# Patient Record
Sex: FEMALE | Race: ASIAN | Employment: OTHER | URBAN - METROPOLITAN AREA
[De-identification: names, ages, dates, MRNs, and addresses within clinical notes are randomized per-mention and may not be internally consistent; named-entity substitution may affect disease eponyms.]

---

## 2017-01-13 ENCOUNTER — OFFICE VISIT (OUTPATIENT)
Dept: INTERNAL MEDICINE CLINIC | Age: 37
End: 2017-01-13

## 2017-01-13 VITALS
HEART RATE: 58 BPM | DIASTOLIC BLOOD PRESSURE: 53 MMHG | SYSTOLIC BLOOD PRESSURE: 110 MMHG | OXYGEN SATURATION: 99 % | BODY MASS INDEX: 30.49 KG/M2 | TEMPERATURE: 97.5 F | WEIGHT: 183 LBS | RESPIRATION RATE: 16 BRPM | HEIGHT: 65 IN

## 2017-01-13 DIAGNOSIS — F41.9 ANXIETY: ICD-10-CM

## 2017-01-13 DIAGNOSIS — E04.9 THYROID GOITER: Primary | ICD-10-CM

## 2017-01-13 DIAGNOSIS — Z12.39 BREAST SCREENING: ICD-10-CM

## 2017-01-13 RX ORDER — CLONAZEPAM 0.5 MG/1
0.5 TABLET ORAL 2 TIMES DAILY
Qty: 60 TAB | Refills: 5 | Status: SHIPPED | OUTPATIENT
Start: 2017-01-13 | End: 2017-08-23 | Stop reason: SDUPTHER

## 2017-01-13 NOTE — MR AVS SNAPSHOT
Visit Information Date & Time Provider Department Dept. Phone Encounter #  
 1/13/2017  7:30 AM Kina Marx MD [facility] [address] [id number] Upcoming Health Maintenance Date Due  
 PAP AKA CERVICAL CYTOLOGY 8/1/2016 INFLUENZA AGE 9 TO ADULT 8/1/2016 DTaP/Tdap/Td series (2 - Td) 3/25/2025 Allergies as of 1/13/2017  Review Complete On: 1/13/2017 By: Miguel Damian LPN No Known Allergies Current Immunizations  Reviewed on 9/30/2015 Name Date Hep B Vaccine 1/1/2006 Td 1/1/2006 Tdap 3/25/2015 10:52 AM  
  
 Not reviewed this visit You Were Diagnosed With   
  
 Codes Comments Thyroid goiter    -  Primary ICD-10-CM: E04.9 ICD-9-CM: 240.9 Anxiety     ICD-10-CM: F41.9 ICD-9-CM: 300.00 Breast screening     ICD-10-CM: Z12.39 
ICD-9-CM: V76.10 Vitals BP Pulse Temp Resp Height(growth percentile) Weight(growth percentile) 110/53 (BP 1 Location: Left arm, BP Patient Position: Sitting) (!) 58 97.5 °F (36.4 °C) (Oral) 16 5' 5\" (1.651 m) 183 lb (83 kg) LMP SpO2 BMI OB Status Smoking Status 01/02/2017 99% 30.45 kg/m2 Having regular periods Never Smoker Vitals History BMI and BSA Data Body Mass Index Body Surface Area  
 30.45 kg/m 2 1.95 m 2 Preferred Pharmacy Pharmacy Name Phone [id number] [address] [phone] Your Updated Medication List  
  
   
This list is accurate as of: 1/13/17  7:45 AM.  Always use your most recent med list.  
  
  
  
  
 clonazePAM 0.5 mg tablet Commonly known as:  González Alexi Take 1 Tab by mouth two (2) times a day. montelukast 10 mg tablet Commonly known as:  SINGULAIR Take 1 Tab by mouth daily. triamcinolone 55 mcg nasal inhaler Commonly known as:  NASACORT  
2 Sprays by Both Nostrils route daily. Prescriptions Printed Refills clonazePAM (KLONOPIN) 0.5 mg tablet 5 Sig: Take 1 Tab by mouth two (2) times a day. Class: Print Route: Oral  
  
We Performed the Following CBC WITH AUTOMATED DIFF [73961 CPT(R)] LIPID PANEL [20922 CPT(R)] METABOLIC PANEL, COMPREHENSIVE [49901 CPT(R)] TSH 3RD GENERATION [62984 CPT(R)] VITAMIN D, 25 HYDROXY [id number] CPT(R)] To-Do List   
 01/13/2017 Imaging:  [facility] 3D MELISSA W MAMMO BI SCREENING INCL CAD Introducing \[facility] [place] [facility]\"" & [facility] SERVICES! Dear Sherly Delgadillo: 
Thank you for requesting a [employer] account. Our records indicate that you already have an active CENX account. You can access your account anytime at [email or web address] Did you know that you can access your hospital and ER discharge instructions at any time in CENX? You can also review all of your test results from your hospital stay or ER visit. Additional Information If you have questions, please visit the Frequently Asked Questions section of the CENX website at [email or web address] Remember, CENX is NOT to be used for urgent needs. For medical emergencies, dial 911. Now available from your iPhone and Android! Please provide this summary of care documentation to your next provider. Your primary care clinician is listed as Sue Jimenez. If you have any questions after today's visit, please call [phone].

## 2017-01-13 NOTE — PROGRESS NOTES
HISTORY OF PRESENT ILLNESS  Baljinder Lopes is a 39 y.o. female. HPI  Anxiety Review:  Patient is seen for anxiety disorder. Current treatment includes clonazepam and doing well with 2 per day-some inc stress in dec but managed well and now work has calmed down  No plans for pregnancy  Trying to exercise. Ongoig symptoms include: none. Patient denies: palpitations, sweating, insomnia. Reported side effects from the treatment: none. Thyroid goiter-no compressive sxs  Review of Systems   Constitutional: Negative for chills, fever, malaise/fatigue and weight loss. Respiratory: Negative for cough, shortness of breath and wheezing. Cardiovascular: Negative for chest pain, palpitations, orthopnea, leg swelling and PND. Gastrointestinal: Negative for heartburn and nausea. Musculoskeletal: Negative for myalgias. Neurological: Negative for dizziness and headaches. Psychiatric/Behavioral: The patient is not nervous/anxious and does not have insomnia. Physical Exam   Constitutional: She is oriented to person, place, and time. She appears well-developed and well-nourished. HENT:   Head: Normocephalic and atraumatic. Neck: Normal range of motion. Neck supple. Carotid bruit is not present. Thyromegaly present. Cardiovascular: Normal rate, regular rhythm, S1 normal, S2 normal, normal heart sounds and intact distal pulses. No murmur heard. Pulmonary/Chest: Effort normal and breath sounds normal. No respiratory distress. She has no wheezes. She has no rales. Musculoskeletal: She exhibits no edema. Neurological: She is alert and oriented to person, place, and time. Psychiatric: She has a normal mood and affect. Her behavior is normal.   Nursing note and vitals reviewed. ASSESSMENT and PLAN  Tran Gutierrez was seen today for anxiety and labs.     Diagnoses and all orders for this visit:    Thyroid goiter  -     CBC WITH AUTOMATED DIFF  -     METABOLIC PANEL, COMPREHENSIVE  -     LIPID PANEL  - TSH 3RD GENERATION  -     VITAMIN D, 25 HYDROXY    Anxiety  -     clonazePAM (KLONOPIN) 0.5 mg tablet; Take 1 Tab by mouth two (2) times a day. Breast screening  -     [facility] 3D MELISSA W MAMMO BI SCREENING INCL CAD;  Future

## 2017-01-14 LAB
25(OH)D3+25(OH)D2 SERPL-MCNC: 21.6 NG/ML (ref 30–100)
ALBUMIN SERPL-MCNC: 4 G/DL (ref 3.5–5.5)
ALBUMIN/GLOB SERPL: 1.5 {RATIO} (ref 1.1–2.5)
ALP SERPL-CCNC: 43 IU/L (ref 39–117)
ALT SERPL-CCNC: 13 IU/L (ref 0–32)
AST SERPL-CCNC: 11 IU/L (ref 0–40)
BASOPHILS # BLD AUTO: 0.1 X10E3/UL (ref 0–0.2)
BASOPHILS NFR BLD AUTO: 1 %
BILIRUB SERPL-MCNC: 0.3 MG/DL (ref 0–1.2)
BUN SERPL-MCNC: 12 MG/DL (ref 6–20)
BUN/CREAT SERPL: 15 (ref 8–20)
CALCIUM SERPL-MCNC: 9.4 MG/DL (ref 8.7–10.2)
CHLORIDE SERPL-SCNC: 99 MMOL/L (ref 96–106)
CHOLEST SERPL-MCNC: 189 MG/DL (ref 100–199)
CO2 SERPL-SCNC: 22 MMOL/L (ref 18–29)
CREAT SERPL-MCNC: 0.79 MG/DL (ref 0.57–1)
EOSINOPHIL # BLD AUTO: 0.2 X10E3/UL (ref 0–0.4)
EOSINOPHIL NFR BLD AUTO: 4 %
ERYTHROCYTE [DISTWIDTH] IN BLOOD BY AUTOMATED COUNT: 12.8 % (ref 12.3–15.4)
GLOBULIN SER CALC-MCNC: 2.6 G/DL (ref 1.5–4.5)
GLUCOSE SERPL-MCNC: 103 MG/DL (ref 65–99)
HCT VFR BLD AUTO: 36.7 % (ref 34–46.6)
HDLC SERPL-MCNC: 52 MG/DL
HGB BLD-MCNC: 12.4 G/DL (ref 11.1–15.9)
IMM GRANULOCYTES # BLD: 0 X10E3/UL (ref 0–0.1)
IMM GRANULOCYTES NFR BLD: 0 %
INTERPRETATION, 910389: NORMAL
LDLC SERPL CALC-MCNC: 124 MG/DL (ref 0–99)
LYMPHOCYTES # BLD AUTO: 1.5 X10E3/UL (ref 0.7–3.1)
LYMPHOCYTES NFR BLD AUTO: 25 %
MCH RBC QN AUTO: 28.6 PG (ref 26.6–33)
MCHC RBC AUTO-ENTMCNC: 33.8 G/DL (ref 31.5–35.7)
MCV RBC AUTO: 85 FL (ref 79–97)
MONOCYTES # BLD AUTO: 0.6 X10E3/UL (ref 0.1–0.9)
MONOCYTES NFR BLD AUTO: 9 %
NEUTROPHILS # BLD AUTO: 3.8 X10E3/UL (ref 1.4–7)
NEUTROPHILS NFR BLD AUTO: 61 %
PLATELET # BLD AUTO: 359 X10E3/UL (ref 150–379)
POTASSIUM SERPL-SCNC: 4.5 MMOL/L (ref 3.5–5.2)
PROT SERPL-MCNC: 6.6 G/DL (ref 6–8.5)
RBC # BLD AUTO: 4.34 X10E6/UL (ref 3.77–5.28)
SODIUM SERPL-SCNC: 137 MMOL/L (ref 134–144)
TRIGL SERPL-MCNC: 65 MG/DL (ref 0–149)
TSH SERPL DL<=0.005 MIU/L-ACNC: 0.87 UIU/ML (ref 0.45–4.5)
VLDLC SERPL CALC-MCNC: 13 MG/DL (ref 5–40)
WBC # BLD AUTO: 6.1 X10E3/UL (ref 3.4–10.8)

## 2017-02-17 ENCOUNTER — HOSPITAL ENCOUNTER (OUTPATIENT)
Dept: MAMMOGRAPHY | Age: 37
Discharge: HOME | End: 2017-02-17
Attending: INTERNAL MEDICINE
Payer: COMMERCIAL

## 2017-02-17 DIAGNOSIS — Z12.39 BREAST SCREENING: ICD-10-CM

## 2017-02-17 PROCEDURE — 77063 BREAST TOMOSYNTHESIS BI: CPT

## 2017-08-23 ENCOUNTER — OFFICE VISIT (OUTPATIENT)
Dept: INTERNAL MEDICINE CLINIC | Age: 37
End: 2017-08-23

## 2017-08-23 VITALS
RESPIRATION RATE: 16 BRPM | BODY MASS INDEX: 30.29 KG/M2 | WEIGHT: 181.8 LBS | HEART RATE: 73 BPM | DIASTOLIC BLOOD PRESSURE: 68 MMHG | OXYGEN SATURATION: 99 % | HEIGHT: 65 IN | SYSTOLIC BLOOD PRESSURE: 115 MMHG | TEMPERATURE: 98.2 F

## 2017-08-23 DIAGNOSIS — F41.9 ANXIETY: ICD-10-CM

## 2017-08-23 DIAGNOSIS — E04.9 THYROID GOITER: Primary | ICD-10-CM

## 2017-08-23 RX ORDER — GLUCOSAM/CHONDRO/HERB 149/HYAL 750-100 MG
TABLET ORAL
COMMUNITY

## 2017-08-23 RX ORDER — CLONAZEPAM 0.5 MG/1
0.5 TABLET ORAL 2 TIMES DAILY
Qty: 60 TAB | Refills: 5 | Status: SHIPPED | OUTPATIENT
Start: 2017-08-23 | End: 2018-02-21 | Stop reason: SDUPTHER

## 2017-08-23 RX ORDER — BISMUTH SUBSALICYLATE 262 MG
1 TABLET,CHEWABLE ORAL DAILY
COMMUNITY

## 2017-08-23 NOTE — MR AVS SNAPSHOT
Visit Information Date & Time Provider Department Dept. Phone Encounter #  
 8/23/2017  2:15 PM Julian Do MD [facility] [id number] Your Appointments 8/23/2017  2:15 PM  
ROUTINE CARE with Julian Do MD  
[facility] [address]) Appt Note: 6 mo follow up CP $50, gb  7/10/17; 6 mo follow up CP $50, gb 7/10/17 [address] [place] [address] [address]  
[phone]  
  
   
 [address] [place] [address] [facility] [address] Upcoming Health Maintenance Date Due  
 PAP AKA CERVICAL CYTOLOGY 8/1/2016 INFLUENZA AGE 9 TO ADULT 8/1/2017 DTaP/Tdap/Td series (2 - Td) 3/25/2025 Allergies as of 8/23/2017  Review Complete On: 8/23/2017 By: Nadja Campoverde LPN No Known Allergies Current Immunizations  Reviewed on 9/30/2015 Name Date Hep B Vaccine 1/1/2006 Td 1/1/2006 Tdap 3/25/2015 10:52 AM  
  
 Not reviewed this visit You Were Diagnosed With   
  
 Codes Comments Thyroid goiter    -  Primary ICD-10-CM: E04.9 ICD-9-CM: 240.9 Anxiety     ICD-10-CM: F41.9 ICD-9-CM: 300.00 Vitals BP Pulse Temp Resp Height(growth percentile) Weight(growth percentile)  
 115/68 (BP 1 Location: Left arm, BP Patient Position: Sitting) 73 98.2 °F (36.8 °C) (Oral) 16 5' 5\" (1.651 m) 181 lb 12.8 oz (82.5 kg) LMP SpO2 BMI OB Status Smoking Status 07/30/2017 (Exact Date) 99% 30.25 kg/m2 Having regular periods Never Smoker Vitals History BMI and BSA Data Body Mass Index Body Surface Area  
 30.25 kg/m 2 1.95 m 2 Preferred Pharmacy Pharmacy Name Phone [id number] Jeanine Reddy, [address] [place] [address] [phone] Your Updated Medication List  
  
   
This list is accurate as of: 8/23/17  2:07 PM.  Always use your most recent med list.  
  
  
  
  
 clonazePAM 0.5 mg tablet Commonly known as:  Benjamin Primer Take 1 Tab by mouth two (2) times a day. FISH OIL 1,000 mg (120 mg-180 mg) Cap Generic drug:  Omega-3-DHA-EPA-Fish Oil Take  by mouth.  
  
 montelukast 10 mg tablet Commonly known as:  SINGULAIR Take 1 Tab by mouth daily. multivitamin tablet Commonly known as:  ONE A DAY Take 1 Tab by mouth daily. triamcinolone 55 mcg nasal inhaler Commonly known as:  NASACORT  
2 Sprays by Both Nostrils route daily. Prescriptions Printed Refills  
 clonazePAM (KLONOPIN) 0.5 mg tablet 5 Sig: Take 1 Tab by mouth two (2) times a day. Class: Print Route: Oral  
  
We Performed the Following REFERRAL TO OBSTETRICS AND GYNECOLOGY [REF51 Custom] Comments:  
 Please evaluate patient for  screen. Referral Information Referral ID Referred By Referred To  
  
 [id number] Jeremy Feliciano MD   
   [address] [facility] [address] [facility], [address] Phone: [phone] Fax: [phone] Visits Status Start Date End Date 1 New Request 8/23/17 8/23/18 If your referral has a status of pending review or denied, additional information will be sent to support the outcome of this decision. Introducing [facility] & HEALTH SERVICES! Dear Bart Gaines: 
Thank you for requesting a [employer] account. Our records indicate that you already have an active [employer] account. You can access your account anytime at [email or web address] Did you know that you can access your hospital and ER discharge instructions at any time in [employer]? You can also review all of your test results from your hospital stay or ER visit. Additional Information If you have questions, please visit the Frequently Asked Questions section of the [employer] website at [email or web address] Remember, [employer] is NOT to be used for urgent needs. For medical emergencies, dial 911. Now available from your iPhone and Android! Please provide this summary of care documentation to your next provider. Your primary care clinician is listed as Sue 68. If you have any questions after today's visit, please call [phone].

## 2017-08-24 NOTE — PROGRESS NOTES
HISTORY OF PRESENT ILLNESS  Marci Mccabe is a 44 y.o. female. HPI  Follow up on thyroid goiter and anxiety, doing well. Very busy with work, going to [place] for her sister's wedding. Has not escalated Klonopin use, no sedation, no evidence of seizures, no panic attacks. Swallowing is fine. No noted change in thyroid goiter. Review of Systems   Constitutional: Negative. Negative for chills, diaphoresis, fever and malaise/fatigue. HENT: Negative for congestion, ear discharge, ear pain, nosebleeds and sore throat. Eyes: Negative for pain and discharge. Respiratory: Negative for cough, hemoptysis, sputum production, shortness of breath and wheezing. Cardiovascular: Negative for chest pain. Neurological: Negative for seizures and headaches. Psychiatric/Behavioral: Negative for depression. The patient is not nervous/anxious and does not have insomnia. Physical Exam   Constitutional: She is oriented to person, place, and time. She appears well-developed and well-nourished. HENT:   Head: Normocephalic and atraumatic. Neck: Normal range of motion. Neck supple. Carotid bruit is not present. Thyromegaly present. Cardiovascular: Normal rate, regular rhythm, S1 normal, S2 normal, normal heart sounds and intact distal pulses. No murmur heard. Pulmonary/Chest: Effort normal and breath sounds normal. No respiratory distress. She has no wheezes. She has no rales. Musculoskeletal: She exhibits no edema. Neurological: She is alert and oriented to person, place, and time. Psychiatric: She has a normal mood and affect. Her behavior is normal.   Nursing note and vitals reviewed. ASSESSMENT and PLAN  Diagnoses and all orders for this visit:    1. Thyroid goiter-cont to follow labs good in jan will repeat at next visit in [place] [address]    2. Anxiety  -     clonazePAM (KLONOPIN) 0.5 mg tablet; Take 1 Tab by mouth two (2) times a day.

## 2018-02-21 ENCOUNTER — OFFICE VISIT (OUTPATIENT)
Dept: INTERNAL MEDICINE CLINIC | Age: 38
End: 2018-02-21

## 2018-02-21 VITALS
DIASTOLIC BLOOD PRESSURE: 75 MMHG | HEART RATE: 77 BPM | HEIGHT: 65 IN | TEMPERATURE: 98.3 F | RESPIRATION RATE: 16 BRPM | BODY MASS INDEX: 29.49 KG/M2 | SYSTOLIC BLOOD PRESSURE: 120 MMHG | OXYGEN SATURATION: 97 % | WEIGHT: 177 LBS

## 2018-02-21 DIAGNOSIS — J30.2 OTHER SEASONAL ALLERGIC RHINITIS: ICD-10-CM

## 2018-02-21 DIAGNOSIS — E04.9 THYROID GOITER: Primary | ICD-10-CM

## 2018-02-21 DIAGNOSIS — F41.9 ANXIETY: ICD-10-CM

## 2018-02-21 RX ORDER — MONTELUKAST SODIUM 10 MG/1
10 TABLET ORAL DAILY
Qty: 30 TAB | Refills: 5 | Status: SHIPPED | OUTPATIENT
Start: 2018-02-21 | End: 2021-09-01 | Stop reason: ALTCHOICE

## 2018-02-21 RX ORDER — CLONAZEPAM 0.5 MG/1
0.5 TABLET ORAL 2 TIMES DAILY
Qty: 60 TAB | Refills: 5 | Status: SHIPPED | OUTPATIENT
Start: 2018-02-21 | End: 2018-08-22 | Stop reason: SDUPTHER

## 2018-02-21 NOTE — PROGRESS NOTES
HISTORY OF PRESENT ILLNESS  Sonia Castellano is a 44 y.o. female. HPI  1. Followup. She has a thyroid goiter. It does not cause trouble with her voice or trouble swallowing. We are going to need an ultrasound and check thyroid levels. 2. Anxiety. Very stable on the Klonopin. 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 Energy is good. She is encouraged to have a pelvic exam.  She will see a gynecologist at [facility].         Review of Systems   Constitutional: Negative for chills, fever and weight loss. Respiratory: Negative for cough, shortness of breath and wheezing. Cardiovascular: Negative for chest pain, palpitations, orthopnea, leg swelling and PND. Gastrointestinal: Negative for heartburn and nausea. Musculoskeletal: Negative for myalgias. Neurological: Negative for dizziness and headaches. Endo/Heme/Allergies: Positive for environmental allergies. Psychiatric/Behavioral: Negative for depression. The patient is not nervous/anxious. Physical Exam   Constitutional: She is oriented to person, place, and time. She appears well-developed and well-nourished. HENT:   Head: Normocephalic and atraumatic. Right Ear: External ear normal.   Left Ear: External ear normal.   Mouth/Throat: Oropharynx is clear and moist.   Neck: Normal range of motion. Neck supple. Carotid bruit is not present. Thyromegaly present. Cardiovascular: Normal rate, regular rhythm, S1 normal, S2 normal, normal heart sounds and intact distal pulses. No murmur heard. Pulmonary/Chest: Effort normal and breath sounds normal. No respiratory distress. She has no wheezes. She has no rales. Musculoskeletal: She exhibits no edema. Neurological: She is alert and oriented to person, place, and time. Psychiatric: She has a normal mood and affect. Her behavior is normal.   Nursing note and vitals reviewed.       ASSESSMENT and PLAN  Diagnoses and all orders for this visit: 1. Thyroid goiter  -     US THYROID/PARATHYROID/SOFT TISS; Future  -     CBC WITH AUTOMATED DIFF  -     METABOLIC PANEL, COMPREHENSIVE  -     LIPID PANEL  -     TSH 3RD GENERATION  -     T4, FREE    2. Anxiety  -     clonazePAM (KLONOPIN) 0.5 mg tablet; Take 1 Tab by mouth two (2) times a day. 3. Other seasonal allergic rhinitis  -     montelukast (SINGULAIR) 10 mg tablet; Take 1 Tab by mouth daily.

## 2018-02-22 LAB
ALBUMIN SERPL-MCNC: 4.3 G/DL (ref 3.5–5.5)
ALBUMIN/GLOB SERPL: 1.7 {RATIO} (ref 1.2–2.2)
ALP SERPL-CCNC: 43 IU/L (ref 39–117)
ALT SERPL-CCNC: 16 IU/L (ref 0–32)
AST SERPL-CCNC: 13 IU/L (ref 0–40)
BASOPHILS # BLD AUTO: 0.1 X10E3/UL (ref 0–0.2)
BASOPHILS NFR BLD AUTO: 1 %
BILIRUB SERPL-MCNC: 0.4 MG/DL (ref 0–1.2)
BUN SERPL-MCNC: 13 MG/DL (ref 6–20)
BUN/CREAT SERPL: 16 (ref 9–23)
CALCIUM SERPL-MCNC: 9.5 MG/DL (ref 8.7–10.2)
CHLORIDE SERPL-SCNC: 99 MMOL/L (ref 96–106)
CHOLEST SERPL-MCNC: 151 MG/DL (ref 100–199)
CO2 SERPL-SCNC: 23 MMOL/L (ref 18–29)
CREAT SERPL-MCNC: 0.81 MG/DL (ref 0.57–1)
EOSINOPHIL # BLD AUTO: 0.3 X10E3/UL (ref 0–0.4)
EOSINOPHIL NFR BLD AUTO: 5 %
ERYTHROCYTE [DISTWIDTH] IN BLOOD BY AUTOMATED COUNT: 13.4 % (ref 12.3–15.4)
GFR SERPLBLD CREATININE-BSD FMLA CKD-EPI: 107 ML/MIN/{1.73_M2}
GFR SERPLBLD CREATININE-BSD FMLA CKD-EPI: 92 ML/MIN/{1.73_M2}
GLOBULIN SER CALC-MCNC: 2.5 G/L (ref 1.5–4.5)
GLUCOSE SERPL-MCNC: 98 MG/DL (ref 65–99)
HCT VFR BLD AUTO: 38.8 % (ref 34–46.6)
HDLC SERPL-MCNC: 45 MG/DL
HGB BLD-MCNC: 12.6 G/DL (ref 11.1–15.9)
IMM GRANULOCYTES # BLD: 0 X10E3/UL (ref 0–0.1)
IMM GRANULOCYTES NFR BLD: 0 %
INTERPRETATION, 910389: NORMAL
LDLC SERPL CALC-MCNC: 93 MG/DL (ref 0–99)
LYMPHOCYTES # BLD AUTO: 1.7 X10E3/UL (ref 0.7–3.1)
LYMPHOCYTES NFR BLD AUTO: 26 %
MCH RBC QN AUTO: 28.3 PG (ref 26.6–33)
MCHC RBC AUTO-ENTMCNC: 32.5 G/DL (ref 31.5–35.7)
MCV RBC AUTO: 87 FL (ref 79–97)
MONOCYTES # BLD AUTO: 0.6 X10E3/UL (ref 0.1–0.9)
MONOCYTES NFR BLD AUTO: 10 %
NEUTROPHILS # BLD AUTO: 3.7 X10E3/UL (ref 1.4–7)
NEUTROPHILS NFR BLD AUTO: 58 %
PLATELET # BLD AUTO: 373 X10E3/UL (ref 150–379)
POTASSIUM SERPL-SCNC: 4.8 MMOL/L (ref 3.5–5.2)
PROT SERPL-MCNC: 6.8 G/DL (ref 6–8.5)
RBC # BLD AUTO: 4.45 X10E6/UL (ref 3.77–5.28)
SODIUM SERPL-SCNC: 139 MMOL/L (ref 134–144)
T4 FREE SERPL-MCNC: 1.28 NG/DL (ref 0.82–1.77)
TRIGL SERPL-MCNC: 66 MG/DL (ref 0–149)
TSH SERPL DL<=0.005 MIU/L-ACNC: 0.78 UIU/ML (ref 0.45–4.5)
VLDLC SERPL CALC-MCNC: 13 MG/DL (ref 5–40)
WBC # BLD AUTO: 6.4 X10E3/UL (ref 3.4–10.8)

## 2018-02-28 ENCOUNTER — HOSPITAL ENCOUNTER (OUTPATIENT)
Dept: ULTRASOUND IMAGING | Age: 38
Discharge: HOME | End: 2018-02-28
Payer: COMMERCIAL

## 2018-02-28 DIAGNOSIS — E04.9 THYROID GOITER: ICD-10-CM

## 2018-02-28 PROCEDURE — 76536 US EXAM OF HEAD AND NECK: CPT

## 2018-03-01 ENCOUNTER — TELEPHONE (OUTPATIENT)
Dept: INTERNAL MEDICINE CLINIC | Age: 38
End: 2018-03-01

## 2018-03-01 NOTE — TELEPHONE ENCOUNTER
Left a message I do want to send her to endocrine for the nodule in thyroid-please schedule appt for her thanks

## 2018-03-01 NOTE — TELEPHONE ENCOUNTER
Chart notes & recent reports faxed to [facility] for review to schedule a new patient visit. They will contact the patient to schedule.

## 2018-04-18 ENCOUNTER — OFFICE VISIT (OUTPATIENT)
Dept: INTERNAL MEDICINE CLINIC | Age: 38
End: 2018-04-18

## 2018-04-18 VITALS
SYSTOLIC BLOOD PRESSURE: 107 MMHG | BODY MASS INDEX: 29.82 KG/M2 | WEIGHT: 179 LBS | HEIGHT: 65 IN | RESPIRATION RATE: 18 BRPM | OXYGEN SATURATION: 99 % | HEART RATE: 68 BPM | DIASTOLIC BLOOD PRESSURE: 73 MMHG | TEMPERATURE: 97.8 F

## 2018-04-18 DIAGNOSIS — J06.9 ACUTE URI: Primary | ICD-10-CM

## 2018-04-18 DIAGNOSIS — E04.9 THYROID GOITER: ICD-10-CM

## 2018-04-18 DIAGNOSIS — B30.9 ACUTE VIRAL CONJUNCTIVITIS OF BOTH EYES: ICD-10-CM

## 2018-04-18 NOTE — PROGRESS NOTES
HISTORY OF PRESENT ILLNESS  Pita Garcia is a 43 y.o. female. HPI  Several concerns. She was exposed to viral URI last weekend with her nieces. Earlier this week she developed conjunctivitis and has started using an antibiotic steroid drop called Zylet in the last 24 hours. She has some ear fullness. No fevers, cough or purulent drainage. She also wants to discuss recent issues with her thyroid goiter. She has been working with endocrine and had a fine needle aspiration, which came back indeterminate for malignancy so she's been referred to the surgeon, Dr. Breanne Sampson, who she will see next week. She is understandably anxious about what to expect with surgery and wants my opinion on having another FNA. Review of Systems   Constitutional: Positive for malaise/fatigue. Negative for chills, diaphoresis and fever. HENT: Negative for congestion, ear discharge, ear pain, nosebleeds and sore throat. Eyes: Positive for discharge. Negative for pain. Respiratory: Negative for cough, hemoptysis, sputum production, shortness of breath and wheezing. Cardiovascular: Negative for chest pain. Neurological: Negative for headaches. Physical Exam   Constitutional: She appears well-developed and well-nourished. HENT:   Head: Normocephalic. Right Ear: Tympanic membrane, external ear and ear canal normal.   Left Ear: Tympanic membrane, external ear and ear canal normal.   Nose: Nose normal.   Mouth/Throat: Oropharynx is clear and moist and mucous membranes are normal. No oropharyngeal exudate. Eyes: Conjunctivae are normal. Pupils are equal, round, and reactive to light. Right eye exhibits no discharge. Left eye exhibits no discharge. Neck: Normal range of motion. Neck supple. Thyromegaly present. Cardiovascular: Normal rate, regular rhythm and normal heart sounds. Pulmonary/Chest: Effort normal and breath sounds normal. No respiratory distress. She has no wheezes. She has no rales. Lymphadenopathy:     She has no cervical adenopathy. Nursing note and vitals reviewed. ASSESSMENT and PLAN  Diagnoses and all orders for this visit:    1. Acute URI  Cont tylenol and zylet eye drops and nasacort  2. Thyroid goiter  Discussed with indeterminate fna of gland I would proceed with surgical eval for partial resection as planned and offered to discuss further after she see the surgeon  3.  Acute viral conjunctivitis of both eyes

## 2018-05-24 ENCOUNTER — TELEPHONE (OUTPATIENT)
Dept: INTERNAL MEDICINE CLINIC | Age: 38
End: 2018-05-24

## 2018-05-24 DIAGNOSIS — F41.9 ANXIETY: ICD-10-CM

## 2018-05-24 NOTE — TELEPHONE ENCOUNTER
Pt is leaving [facility] and [facility] if [facility] clonazepam a day early.  [phone]

## 2018-08-22 ENCOUNTER — OFFICE VISIT (OUTPATIENT)
Dept: INTERNAL MEDICINE CLINIC | Age: 38
End: 2018-08-22

## 2018-08-22 VITALS
BODY MASS INDEX: 29.99 KG/M2 | RESPIRATION RATE: 18 BRPM | HEIGHT: 65 IN | HEART RATE: 73 BPM | OXYGEN SATURATION: 98 % | WEIGHT: 180 LBS | TEMPERATURE: 98 F | DIASTOLIC BLOOD PRESSURE: 62 MMHG | SYSTOLIC BLOOD PRESSURE: 105 MMHG

## 2018-08-22 DIAGNOSIS — F41.9 ANXIETY: ICD-10-CM

## 2018-08-22 DIAGNOSIS — E04.9 THYROID GOITER: ICD-10-CM

## 2018-08-22 DIAGNOSIS — Z86.39 H/O THYROID NODULE: Primary | ICD-10-CM

## 2018-08-22 RX ORDER — CLONAZEPAM 0.5 MG/1
0.5 TABLET ORAL 2 TIMES DAILY
Qty: 60 TAB | Refills: 5 | Status: SHIPPED | OUTPATIENT
Start: 2018-08-22 | End: 2018-10-19 | Stop reason: SDUPTHER

## 2018-08-22 NOTE — PROGRESS NOTES
HISTORY OF PRESENT ILLNESS  Cintia Celestin is a 43 y.o. female. HPI  First visit with me after left thyroid resection because of nodule. Pathology came back benign, showing maybe early Hashimoto's. Surgery was done July 24th at [facility] with Dr. Pat Webb and pathology report was reviewed. She feels well. She denies any symptoms of hypo or hyperthyroidism. We have discussed monitoring of levels post surgery and will plan check today and then again in three months assuming levels are good. Anxiety, very stable on Klonopin. No escalation of use. Will refill. Review of Systems   Constitutional: Negative for chills, fever, malaise/fatigue and weight loss. Respiratory: Negative for cough, shortness of breath and wheezing. Cardiovascular: Negative for chest pain, palpitations, orthopnea, leg swelling and PND. Gastrointestinal: Negative for constipation, diarrhea, heartburn and nausea. Musculoskeletal: Negative for myalgias. Neurological: Negative for dizziness, tremors and headaches. Physical Exam   Constitutional: She is oriented to person, place, and time. She appears well-developed and well-nourished. HENT:   Head: Normocephalic and atraumatic. Neck: Normal range of motion. Neck supple. Carotid bruit is not present. No thyromegaly present. Healed thyroid scar no adenopathy   Cardiovascular: Normal rate, regular rhythm, S1 normal, S2 normal, normal heart sounds and intact distal pulses. No murmur heard. Pulmonary/Chest: Effort normal and breath sounds normal. No respiratory distress. She has no wheezes. She has no rales. Musculoskeletal: She exhibits no edema. Neurological: She is alert and oriented to person, place, and time. Psychiatric: She has a normal mood and affect. Her behavior is normal.   Nursing note and vitals reviewed. ASSESSMENT and PLAN  Diagnoses and all orders for this visit:    1.  H/O thyroid nodule  -     METABOLIC PANEL, COMPREHENSIVE  -     TSH 3RD GENERATION  -     T4, FREE  -     CBC WITH AUTOMATED DIFF    2. Anxiety  -     clonazePAM (KLONOPIN) 0.5 mg tablet; Take 1 Tab by mouth two (2) times a day.     3. Thyroid goiter  Sp left thyroid resection doing well

## 2018-08-23 LAB
ALBUMIN SERPL-MCNC: 4.5 G/DL (ref 3.5–5.5)
ALBUMIN/GLOB SERPL: 1.7 {RATIO} (ref 1.2–2.2)
ALP SERPL-CCNC: 44 IU/L (ref 39–117)
ALT SERPL-CCNC: 13 IU/L (ref 0–32)
AST SERPL-CCNC: 16 IU/L (ref 0–40)
BASOPHILS # BLD AUTO: 0.1 X10E3/UL (ref 0–0.2)
BASOPHILS NFR BLD AUTO: 1 %
BILIRUB SERPL-MCNC: 0.6 MG/DL (ref 0–1.2)
BUN SERPL-MCNC: 12 MG/DL (ref 6–20)
BUN/CREAT SERPL: 14 (ref 9–23)
CALCIUM SERPL-MCNC: 9.4 MG/DL (ref 8.7–10.2)
CHLORIDE SERPL-SCNC: 103 MMOL/L (ref 96–106)
CO2 SERPL-SCNC: 21 MMOL/L (ref 20–29)
CREAT SERPL-MCNC: 0.85 MG/DL (ref 0.57–1)
EOSINOPHIL # BLD AUTO: 0.3 X10E3/UL (ref 0–0.4)
EOSINOPHIL NFR BLD AUTO: 4 %
ERYTHROCYTE [DISTWIDTH] IN BLOOD BY AUTOMATED COUNT: 13.3 % (ref 12.3–15.4)
GLOBULIN SER CALC-MCNC: 2.7 G/DL (ref 1.5–4.5)
GLUCOSE SERPL-MCNC: 104 MG/DL (ref 65–99)
HCT VFR BLD AUTO: 37.6 % (ref 34–46.6)
HGB BLD-MCNC: 12.8 G/DL (ref 11.1–15.9)
IMM GRANULOCYTES # BLD: 0 X10E3/UL (ref 0–0.1)
IMM GRANULOCYTES NFR BLD: 0 %
LYMPHOCYTES # BLD AUTO: 1.9 X10E3/UL (ref 0.7–3.1)
LYMPHOCYTES NFR BLD AUTO: 26 %
MCH RBC QN AUTO: 28.3 PG (ref 26.6–33)
MCHC RBC AUTO-ENTMCNC: 34 G/DL (ref 31.5–35.7)
MCV RBC AUTO: 83 FL (ref 79–97)
MONOCYTES # BLD AUTO: 0.8 X10E3/UL (ref 0.1–0.9)
MONOCYTES NFR BLD AUTO: 11 %
NEUTROPHILS # BLD AUTO: 4.1 X10E3/UL (ref 1.4–7)
NEUTROPHILS NFR BLD AUTO: 58 %
PLATELET # BLD AUTO: 391 X10E3/UL (ref 150–379)
POTASSIUM SERPL-SCNC: 4.4 MMOL/L (ref 3.5–5.2)
PROT SERPL-MCNC: 7.2 G/DL (ref 6–8.5)
RBC # BLD AUTO: 4.52 X10E6/UL (ref 3.77–5.28)
SODIUM SERPL-SCNC: 140 MMOL/L (ref 134–144)
T4 FREE SERPL-MCNC: 1.12 NG/DL (ref 0.82–1.77)
TSH SERPL DL<=0.005 MIU/L-ACNC: 2.37 UIU/ML (ref 0.45–4.5)
WBC # BLD AUTO: 7.1 X10E3/UL (ref 3.4–10.8)

## 2018-10-19 ENCOUNTER — TELEPHONE (OUTPATIENT)
Dept: INTERNAL MEDICINE CLINIC | Age: 38
End: 2018-10-19

## 2018-10-19 DIAGNOSIS — F41.9 ANXIETY: ICD-10-CM

## 2018-10-19 NOTE — TELEPHONE ENCOUNTER
----- Message from Sandrita Pendleton sent at 10/19/2018  2:05 PM EDT -----  Regarding: Dr. Rika Magallanes  Pt is going out of town for [facility]. She will be flying to [facility]. She wants to make sure Dr. Yvette Robb will approve the medication if the pharmacy calls.   Best contact number is [phone]

## 2018-10-22 RX ORDER — CLONAZEPAM 0.5 MG/1
0.5 TABLET ORAL 2 TIMES DAILY
Qty: 60 TAB | Refills: 5 | Status: SHIPPED | OUTPATIENT
Start: 2018-10-22 | End: 2019-02-20 | Stop reason: SDUPTHER

## 2018-11-14 ENCOUNTER — OFFICE VISIT (OUTPATIENT)
Dept: INTERNAL MEDICINE CLINIC | Age: 38
End: 2018-11-14

## 2018-11-14 VITALS
HEART RATE: 60 BPM | WEIGHT: 174 LBS | SYSTOLIC BLOOD PRESSURE: 118 MMHG | OXYGEN SATURATION: 99 % | BODY MASS INDEX: 28.99 KG/M2 | RESPIRATION RATE: 16 BRPM | DIASTOLIC BLOOD PRESSURE: 64 MMHG | TEMPERATURE: 98 F | HEIGHT: 65 IN

## 2018-11-14 DIAGNOSIS — R73.01 IFG (IMPAIRED FASTING GLUCOSE): ICD-10-CM

## 2018-11-14 DIAGNOSIS — Z86.39 H/O THYROID NODULE: Primary | ICD-10-CM

## 2018-11-14 NOTE — PROGRESS NOTES
HISTORY OF PRESENT ILLNESS  Hira Zambrano is a 43 y.o. female. HPI  She feels well. Had to go to [place] because her uncle has some type of pulmonary effusion, unclear what the cause is. She has had thyroid nodule resected this summer, Hashimoto's thyroid, benign pathology. Thyroid function has so far been okay and she has not had any symptoms of hypo or hyperthyroidism. Her glucose was 104 and we discussed diet and exercise for this. Generally she feels well. Review of Systems   Constitutional: Negative for chills, fever, malaise/fatigue and weight loss. Respiratory: Negative for cough, shortness of breath and wheezing. Cardiovascular: Negative for chest pain, palpitations, orthopnea, leg swelling and PND. Gastrointestinal: Negative for abdominal pain, constipation, diarrhea, heartburn and nausea. Musculoskeletal: Negative for myalgias. Neurological: Negative for dizziness and headaches. Physical Exam   Constitutional: She is oriented to person, place, and time. She appears well-developed and well-nourished. HENT:   Head: Normocephalic and atraumatic. Neck: Normal range of motion. Neck supple. Carotid bruit is not present. No thyromegaly present. Healed scar at neck no masses or adenopathy   Cardiovascular: Normal rate, regular rhythm, S1 normal, S2 normal, normal heart sounds and intact distal pulses. No murmur heard. Pulmonary/Chest: Effort normal and breath sounds normal. No respiratory distress. She has no wheezes. She has no rales. Musculoskeletal: She exhibits no edema. Neurological: She is alert and oriented to person, place, and time. Psychiatric: She has a normal mood and affect. Her behavior is normal.   Nursing note and vitals reviewed. ASSESSMENT and PLAN  Diagnoses and all orders for this visit:    1. H/O thyroid nodule-benign resected  -     THYROID PANEL W/TSH  -     THYROID PEROXIDASE (TPO) AB    2.  IFG (impaired fasting glucose)  -     HEMOGLOBIN A1C WITH EAG

## 2018-11-15 LAB
EST. AVERAGE GLUCOSE BLD GHB EST-MCNC: 120 MG/DL
FT4I SERPL CALC-MCNC: 1.6 (ref 1.2–4.9)
HBA1C MFR BLD: 5.8 % (ref 4.8–5.6)
T3RU NFR SERPL: 25 % (ref 24–39)
T4 SERPL-MCNC: 6.3 UG/DL (ref 4.5–12)
THYROPEROXIDASE AB SERPL-ACNC: 10 IU/ML (ref 0–34)
TSH SERPL DL<=0.005 MIU/L-ACNC: 1.69 UIU/ML (ref 0.45–4.5)

## 2018-11-21 ENCOUNTER — TELEPHONE (OUTPATIENT)
Dept: INTERNAL MEDICINE CLINIC | Age: 38
End: 2018-11-21

## 2018-11-21 NOTE — TELEPHONE ENCOUNTER
----- Message from Deanne Navarro sent at 11/21/2018  9:55 AM EST -----  Regarding: Dr. Sibley Link from [facility] on behalf of legal and general life insurance checking on request for medical records faxed 11/15 and 11/19. Would like to know if we have sent it to [facility].   Annika Oliveira contact  [phone]

## 2019-02-20 ENCOUNTER — OFFICE VISIT (OUTPATIENT)
Dept: INTERNAL MEDICINE CLINIC | Age: 39
End: 2019-02-20

## 2019-02-20 VITALS
WEIGHT: 184 LBS | OXYGEN SATURATION: 98 % | TEMPERATURE: 98.6 F | BODY MASS INDEX: 30.66 KG/M2 | SYSTOLIC BLOOD PRESSURE: 111 MMHG | HEIGHT: 65 IN | RESPIRATION RATE: 16 BRPM | HEART RATE: 68 BPM | DIASTOLIC BLOOD PRESSURE: 63 MMHG

## 2019-02-20 DIAGNOSIS — E04.9 THYROID GOITER: Primary | ICD-10-CM

## 2019-02-20 DIAGNOSIS — F41.9 ANXIETY: ICD-10-CM

## 2019-02-20 DIAGNOSIS — R73.01 IFG (IMPAIRED FASTING GLUCOSE): ICD-10-CM

## 2019-02-20 RX ORDER — CLONAZEPAM 0.5 MG/1
0.5 TABLET ORAL 2 TIMES DAILY
Qty: 60 TAB | Refills: 5 | Status: SHIPPED | OUTPATIENT
Start: 2019-02-20 | End: 2019-09-18 | Stop reason: SDUPTHER

## 2019-02-20 NOTE — PROGRESS NOTES
HISTORY OF PRESENT ILLNESS  Fidel Betancourt is a 43 y.o. female. HPI  Has a history of thyroid goiter, status post partial resection in the summer showed early Hashimoto's. We have been following thyroid function and antibodies, which have so far been okay. She feels well. No symptoms of hypo or hyperthyroidism. Uncle was diagnosed with TB. He lives in [place]. She has mildly impaired fasting glucose and is trying to watch her diet. She is stable on Klonopin and has not escalated use. Needs a refill. Review of Systems   Constitutional: Negative for chills, fever, malaise/fatigue and weight loss. Respiratory: Negative for cough, shortness of breath and wheezing. Cardiovascular: Negative for chest pain, palpitations, orthopnea, leg swelling and PND. Gastrointestinal: Negative for heartburn and nausea. Musculoskeletal: Negative for myalgias. Neurological: Negative for dizziness and headaches. Psychiatric/Behavioral: Negative for depression. The patient is not nervous/anxious. Physical Exam   Constitutional: She is oriented to person, place, and time. She appears well-developed and well-nourished. HENT:   Head: Normocephalic and atraumatic. Neck: Normal range of motion. Neck supple. Carotid bruit is not present. No thyromegaly present. Healed scar at neck   Cardiovascular: Normal rate, regular rhythm, S1 normal, S2 normal, normal heart sounds and intact distal pulses. No murmur heard. Pulmonary/Chest: Effort normal and breath sounds normal. No respiratory distress. She has no wheezes. She has no rales. Musculoskeletal: She exhibits no edema. Lymphadenopathy:     She has no cervical adenopathy. Neurological: She is alert and oriented to person, place, and time. Psychiatric: She has a normal mood and affect. Her behavior is normal.   Nursing note and vitals reviewed. ASSESSMENT and PLAN  Diagnoses and all orders for this visit:    1.  Thyroid goiter-biopsy showed hashimotos  -     THYROID PANEL W/TSH  -     THYROID PEROXIDASE (TPO) AB    2. Anxiety  -     clonazePAM (KLONOPIN) 0.5 mg tablet; Take 1 Tab by mouth two (2) times a day.     3. IFG (impaired fasting glucose)  -     HEMOGLOBIN A1C WITH EAG      lab results and schedule of future lab studies reviewed with patient  reviewed diet, exercise and weight control

## 2019-02-21 LAB
EST. AVERAGE GLUCOSE BLD GHB EST-MCNC: 117 MG/DL
FT4I SERPL CALC-MCNC: 1.8 (ref 1.2–4.9)
HBA1C MFR BLD: 5.7 % (ref 4.8–5.6)
T3RU NFR SERPL: 26 % (ref 24–39)
T4 SERPL-MCNC: 6.8 UG/DL (ref 4.5–12)
THYROPEROXIDASE AB SERPL-ACNC: 12 IU/ML (ref 0–34)
TSH SERPL DL<=0.005 MIU/L-ACNC: 1.4 UIU/ML (ref 0.45–4.5)

## 2019-04-24 DIAGNOSIS — F41.9 ANXIETY: ICD-10-CM

## 2019-04-24 RX ORDER — CLONAZEPAM 0.5 MG/1
TABLET ORAL
Qty: 60 TAB | Refills: 0 | OUTPATIENT
Start: 2019-04-24

## 2019-04-24 NOTE — TELEPHONE ENCOUNTER
I wrote for 60 with 5 refills in 2/20/19  Can you call the pharmacy to see if they got that order?  If not ok to give 60 with 4 refills

## 2019-09-18 ENCOUNTER — OFFICE VISIT (OUTPATIENT)
Dept: INTERNAL MEDICINE CLINIC | Age: 39
End: 2019-09-18

## 2019-09-18 VITALS
DIASTOLIC BLOOD PRESSURE: 70 MMHG | BODY MASS INDEX: 30.99 KG/M2 | HEART RATE: 67 BPM | TEMPERATURE: 98.5 F | WEIGHT: 186 LBS | HEIGHT: 65 IN | OXYGEN SATURATION: 98 % | SYSTOLIC BLOOD PRESSURE: 106 MMHG | RESPIRATION RATE: 16 BRPM

## 2019-09-18 DIAGNOSIS — F41.9 ANXIETY: ICD-10-CM

## 2019-09-18 DIAGNOSIS — E55.9 VITAMIN D DEFICIENCY: ICD-10-CM

## 2019-09-18 DIAGNOSIS — R73.01 IFG (IMPAIRED FASTING GLUCOSE): ICD-10-CM

## 2019-09-18 DIAGNOSIS — E04.9 THYROID GOITER: Primary | ICD-10-CM

## 2019-09-18 RX ORDER — CLONAZEPAM 0.5 MG/1
0.5 TABLET ORAL 2 TIMES DAILY
Qty: 60 TAB | Refills: 5 | Status: SHIPPED | OUTPATIENT
Start: 2019-09-18 | End: 2020-08-31 | Stop reason: SDUPTHER

## 2019-09-18 RX ORDER — CHOLECALCIFEROL (VITAMIN D3) 125 MCG
CAPSULE ORAL
COMMUNITY

## 2019-09-18 NOTE — PROGRESS NOTES
HISTORY OF PRESENT ILLNESS  Darin Hinds is a 35 y.o. female. HPI  Follow up for thyroid goiter. She had a left thyroidectomy over a year ago. She feels well. She has had no symptoms of change in voice or trouble swallowing. She is due for thyroid ultrasound, as well as thyroid function. She is struggling trying to lose weight and we discussed strategies, including portion issues. Anxiety and history of seizure. She has done very well on Klonopin 0.5 b.i.d. No side effects, no sedation. Needs a refill. History of vitamin D deficiency, on 2,000 units daily. Due for levels. Review of Systems   Constitutional: Negative for chills, fever, malaise/fatigue and weight loss. Respiratory: Negative for cough, shortness of breath and wheezing. Cardiovascular: Negative for chest pain, palpitations, orthopnea, leg swelling and PND. Gastrointestinal: Negative for heartburn and nausea. Musculoskeletal: Negative for myalgias. Skin: Negative for rash. Neurological: Negative for dizziness, seizures and headaches. Psychiatric/Behavioral: The patient is not nervous/anxious. Physical Exam   Constitutional: She is oriented to person, place, and time. She appears well-developed and well-nourished. HENT:   Head: Normocephalic and atraumatic. Neck: Normal range of motion. Neck supple. Carotid bruit is not present. Thyromegaly present. Cardiovascular: Normal rate, regular rhythm, S1 normal, S2 normal, normal heart sounds and intact distal pulses. No murmur heard. Pulmonary/Chest: Effort normal and breath sounds normal. No respiratory distress. She has no wheezes. She has no rales. Musculoskeletal: She exhibits no edema. Lymphadenopathy:     She has no cervical adenopathy. Neurological: She is alert and oriented to person, place, and time. Psychiatric: She has a normal mood and affect. Her behavior is normal.   Nursing note and vitals reviewed.       ASSESSMENT and PLAN  Diagnoses and all orders for this visit:    1. Thyroid goiter  -     TSH 3RD GENERATION  -     T4, FREE  -     US THYROID/PARATHYROID/SOFT TISS; Future    2. IFG (impaired fasting glucose)  -     HEMOGLOBIN A1C WITH EAG  -     METABOLIC PANEL, COMPREHENSIVE  -     LIPID PANEL    3. Vitamin D deficiency  -     VITAMIN D, 25 HYDROXY    4. Anxiety  -     clonazePAM (KLONOPIN) 0.5 mg tablet; Take 1 Tab by mouth two (2) times a day.     Discussed weight loss

## 2019-09-19 LAB
25(OH)D3+25(OH)D2 SERPL-MCNC: 29.2 NG/ML (ref 30–100)
ALBUMIN SERPL-MCNC: 4.5 G/DL (ref 3.5–5.5)
ALBUMIN/GLOB SERPL: 2 {RATIO} (ref 1.2–2.2)
ALP SERPL-CCNC: 41 IU/L (ref 39–117)
ALT SERPL-CCNC: 13 IU/L (ref 0–32)
AST SERPL-CCNC: 13 IU/L (ref 0–40)
BILIRUB SERPL-MCNC: 0.5 MG/DL (ref 0–1.2)
BUN SERPL-MCNC: 11 MG/DL (ref 6–24)
BUN/CREAT SERPL: 13 (ref 9–23)
CALCIUM SERPL-MCNC: 9.1 MG/DL (ref 8.7–10.2)
CHLORIDE SERPL-SCNC: 102 MMOL/L (ref 96–106)
CHOLEST SERPL-MCNC: 185 MG/DL (ref 100–199)
CO2 SERPL-SCNC: 24 MMOL/L (ref 20–29)
CREAT SERPL-MCNC: 0.82 MG/DL (ref 0.57–1)
EST. AVERAGE GLUCOSE BLD GHB EST-MCNC: 123 MG/DL
GLOBULIN SER CALC-MCNC: 2.2 G/DL (ref 1.5–4.5)
GLUCOSE SERPL-MCNC: 104 MG/DL (ref 65–99)
HBA1C MFR BLD: 5.9 % (ref 4.8–5.6)
HDLC SERPL-MCNC: 49 MG/DL
INTERPRETATION, 910389: NORMAL
LDLC SERPL CALC-MCNC: 114 MG/DL (ref 0–99)
POTASSIUM SERPL-SCNC: 4.5 MMOL/L (ref 3.5–5.2)
PROT SERPL-MCNC: 6.7 G/DL (ref 6–8.5)
SODIUM SERPL-SCNC: 139 MMOL/L (ref 134–144)
T4 FREE SERPL-MCNC: 1.07 NG/DL (ref 0.82–1.77)
TRIGL SERPL-MCNC: 110 MG/DL (ref 0–149)
TSH SERPL DL<=0.005 MIU/L-ACNC: 1.41 UIU/ML (ref 0.45–4.5)
VLDLC SERPL CALC-MCNC: 22 MG/DL (ref 5–40)

## 2019-09-25 ENCOUNTER — HOSPITAL ENCOUNTER (OUTPATIENT)
Dept: ULTRASOUND IMAGING | Age: 39
Discharge: HOME | End: 2019-09-25
Attending: INTERNAL MEDICINE
Payer: COMMERCIAL

## 2019-09-25 DIAGNOSIS — E04.9 THYROID GOITER: ICD-10-CM

## 2019-09-25 PROCEDURE — 76536 US EXAM OF HEAD AND NECK: CPT

## 2020-03-17 ENCOUNTER — TELEPHONE (OUTPATIENT)
Dept: INTERNAL MEDICINE CLINIC | Age: 40
End: 2020-03-17

## 2020-03-17 DIAGNOSIS — F41.9 ANXIETY: Primary | ICD-10-CM

## 2020-03-17 RX ORDER — CLONAZEPAM 0.5 MG/1
0.5 TABLET ORAL
Qty: 30 TAB | Refills: 4 | Status: SHIPPED | OUTPATIENT
Start: 2020-03-17 | End: 2020-03-23 | Stop reason: DRUGHIGH

## 2020-03-17 NOTE — TELEPHONE ENCOUNTER
Left her a message that I refilled the klonopin and do not need to see her in office given covid and will see her in the summer for follow up

## 2020-03-23 DIAGNOSIS — F41.9 ANXIETY: ICD-10-CM

## 2020-03-23 RX ORDER — CLONAZEPAM 0.5 MG/1
0.5 TABLET ORAL 2 TIMES DAILY
Qty: 60 TAB | Refills: 4 | Status: SHIPPED | OUTPATIENT
Start: 2020-03-23 | End: 2021-02-26 | Stop reason: SDUPTHER

## 2020-03-23 NOTE — TELEPHONE ENCOUNTER
----- Message from Sam King sent at 3/23/2020  9:28 AM EDT -----  Regarding: Jeffrey/telephone  Pt stated a Rx for  Clonazepam one a day was called in and she stated she takes two a day for a refill for 6 months. Pts number is [phone] and [facility] number is [phone].

## 2020-08-31 ENCOUNTER — VIRTUAL VISIT (OUTPATIENT)
Dept: INTERNAL MEDICINE CLINIC | Age: 40
End: 2020-08-31
Payer: COMMERCIAL

## 2020-08-31 DIAGNOSIS — E04.9 THYROID GOITER: Primary | ICD-10-CM

## 2020-08-31 DIAGNOSIS — R56.9 SEIZURE (HCC): ICD-10-CM

## 2020-08-31 DIAGNOSIS — K59.09 OTHER CONSTIPATION: ICD-10-CM

## 2020-08-31 DIAGNOSIS — F41.9 ANXIETY: ICD-10-CM

## 2020-08-31 PROCEDURE — 99214 OFFICE O/P EST MOD 30 MIN: CPT | Performed by: INTERNAL MEDICINE

## 2020-08-31 RX ORDER — CLONAZEPAM 0.5 MG/1
0.5 TABLET ORAL 2 TIMES DAILY
Qty: 60 TAB | Refills: 5 | Status: SHIPPED | OUTPATIENT
Start: 2020-08-31 | End: 2021-09-01 | Stop reason: SDUPTHER

## 2020-08-31 NOTE — PROGRESS NOTES
Consent: Pratik Cortez, who was seen by synchronous (real-time) audio-video technology, and/or her healthcare decision maker, is aware that this patient-initiated, Telehealth encounter on 8/31/2020 is a billable service, with coverage as determined by her insurance carrier. She is aware that she may receive a bill and has provided verbal consent to proceed: YES  712  Subjective:   Pratik Cortez is a 38 y.o. female who was seen for Medication Evaluation      Seen for med check. Issues:  1. She has had some constipation, particularly with menstrual cycle, some straining and occasional bright red blood per rectum. She does have a thyroid goiter, does not feel compressive symptoms, swallows okay, no trouble with pain or talking. Will repeat ultrasound this September and check thyroid levels given the new constipation. 2. History of anxiety, seizure disorder. No episodes of seizures. Well controlled on Clonazepam 0.5 b.i.d.  Busy with work. Current Outpatient Medications   Medication Sig    clonazePAM (KlonoPIN) 0.5 mg tablet Take 1 Tab by mouth two (2) times a day.  clonazePAM (KlonoPIN) 0.5 mg tablet Take 1 Tab by mouth two (2) times a day. Max Daily Amount: 1 mg.  cholecalciferol, vitamin D3, (VITAMIN D3) 2,000 unit tab Take  by mouth.  montelukast (SINGULAIR) 10 mg tablet Take 1 Tab by mouth daily.  multivitamin (ONE A DAY) tablet Take 1 Tab by mouth daily.  Omega-3-DHA-EPA-Fish Oil (FISH OIL) 1,000 mg (120 mg-180 mg) cap Take  by mouth.  triamcinolone (NASACORT) 55 mcg nasal inhaler 2 Sprays by Both Nostrils route daily. (Patient taking differently: 2 Sprays by Both Nostrils route as needed.)     No current facility-administered medications for this visit.         No Known Allergies    Past Medical History:   Diagnosis Date    Pap smear for cervical cancer screening 4/30/13    Negative, HPV negative    Seizure ([facility] [address] 10/15/2012       ROS  All other systems reviewed and negative, unless mentioned in HPI    Objective:   Vital Signs: (As obtained by patient/caregiver at home)  There were no vitals taken for this visit. [INSTRUCTIONS:  \"[x]\" Indicates a positive item  \"[]\" Indicates a negative item  -- DELETE ALL ITEMS NOT EXAMINED]    Constitutional: [x] Appears well-developed and well-nourished [x] No apparent distress      [] Abnormal -     Mental status: [x] Alert and awake  [x] Oriented to person/place/time [x] Able to follow commands    [] Abnormal -     Eyes:   EOM    [x]  Normal    [] Abnormal -   Sclera  [x]  Normal    [] Abnormal -          Discharge [x]  None visible   [] Abnormal -     HENT: [x] Normocephalic, atraumatic  [] Abnormal -       External Ears [x] Normal  [] Abnormal -    Neck: [x] No visualized mass [] Abnormal -     Pulmonary/Chest: [x] Respiratory effort normal   [x] No visualized signs of difficulty breathing or respiratory distress        [] Abnormal -      Musculoskeletal:            [x] Normal range of motion of neck        [] Abnormal -     Neurological:        [x] No Facial Asymmetry (Cranial nerve 7 motor function) (limited exam due to video visit)          [x] No gaze palsy        [] Abnormal -          Skin:        [x] No significant exanthematous lesions or discoloration noted on facial skin         [] Abnormal -            Psychiatric:       [x] Normal Affect [] Abnormal -           Other pertinent observable physical exam findings:-        Assessment & Plan:   Diagnoses and all orders for this visit:    1. Thyroid goiter  -     TSH 3RD GENERATION; Future  -     T4, FREE; Future  -     US THYROID/PARATHYROID/SOFT TISS; Future    2. Seizure ([address]  -     VITAMIN D, 25 HYDROXY; Future    3. Anxiety  -     clonazePAM (KlonoPIN) 0.5 mg tablet; Take 1 Tab by mouth two (2) times a day. 4. Other constipation  -     METABOLIC PANEL, COMPREHENSIVE; Future  -     CBC WITH AUTOMATED DIFF;  Future  If thyroid is normal and sxs persist to see  gianna          We discussed the expected course, resolution and complications of the diagnosis(es) in detail. Medication risks, benefits, costs, interactions, and alternatives were discussed as indicated. I advised her to contact the office if her condition worsens, changes or fails to improve as anticipated. She expressed understanding with the diagnosis(es) and plan. Paige Blackwood is a 38 y.o. female being evaluated by a video visit encounter for concerns as above. A caregiver was present when appropriate. Due to this being a TeleHealth encounter (During [facility]-53 public health emergency), evaluation of the following organ systems was limited: Vitals/Constitutional/EENT/Resp/CV/GI//MS/Neuro/Skin/Heme-Lymph-Imm. Pursuant to the emergency declaration under the [facility], 1135 waiver authority and the [employer] Act, this Virtual  Visit was conducted, with patient's (and/or legal guardian's) consent, to reduce the patient's risk of exposure to COVID-19 and provide necessary medical care. Services were provided through a video synchronous discussion virtually to substitute for in-person clinic visit. Patient and provider were located at their individual homes.

## 2020-09-11 ENCOUNTER — HOSPITAL ENCOUNTER (OUTPATIENT)
Dept: ULTRASOUND IMAGING | Age: 40
Discharge: HOME | End: 2020-09-11
Attending: INTERNAL MEDICINE
Payer: COMMERCIAL

## 2020-09-11 DIAGNOSIS — E04.9 THYROID GOITER: ICD-10-CM

## 2020-09-11 PROCEDURE — 76536 US EXAM OF HEAD AND NECK: CPT

## 2020-09-26 LAB
25(OH)D3+25(OH)D2 SERPL-MCNC: 39.9 NG/ML (ref 30–100)
ALBUMIN SERPL-MCNC: 4.6 G/DL (ref 3.8–4.8)
ALBUMIN/GLOB SERPL: 1.8 {RATIO} (ref 1.2–2.2)
ALP SERPL-CCNC: 43 IU/L (ref 39–117)
ALT SERPL-CCNC: 9 IU/L (ref 0–32)
AST SERPL-CCNC: 11 IU/L (ref 0–40)
BASOPHILS # BLD AUTO: 0.1 X10E3/UL (ref 0–0.2)
BASOPHILS NFR BLD AUTO: 1 %
BILIRUB SERPL-MCNC: 0.4 MG/DL (ref 0–1.2)
BUN SERPL-MCNC: 12 MG/DL (ref 6–24)
BUN/CREAT SERPL: 15 (ref 9–23)
CALCIUM SERPL-MCNC: 9.1 MG/DL (ref 8.7–10.2)
CHLORIDE SERPL-SCNC: 101 MMOL/L (ref 96–106)
CO2 SERPL-SCNC: 20 MMOL/L (ref 20–29)
CREAT SERPL-MCNC: 0.79 MG/DL (ref 0.57–1)
EOSINOPHIL # BLD AUTO: 0.2 X10E3/UL (ref 0–0.4)
EOSINOPHIL NFR BLD AUTO: 3 %
ERYTHROCYTE [DISTWIDTH] IN BLOOD BY AUTOMATED COUNT: 12.3 % (ref 11.7–15.4)
GLOBULIN SER CALC-MCNC: 2.5 G/DL (ref 1.5–4.5)
GLUCOSE SERPL-MCNC: 104 MG/DL (ref 65–99)
HCT VFR BLD AUTO: 41.5 % (ref 34–46.6)
HGB BLD-MCNC: 13.7 G/DL (ref 11.1–15.9)
IMM GRANULOCYTES # BLD AUTO: 0 X10E3/UL (ref 0–0.1)
IMM GRANULOCYTES NFR BLD AUTO: 0 %
LYMPHOCYTES # BLD AUTO: 1.8 X10E3/UL (ref 0.7–3.1)
LYMPHOCYTES NFR BLD AUTO: 28 %
MCH RBC QN AUTO: 28 PG (ref 26.6–33)
MCHC RBC AUTO-ENTMCNC: 33 G/DL (ref 31.5–35.7)
MCV RBC AUTO: 85 FL (ref 79–97)
MONOCYTES # BLD AUTO: 0.5 X10E3/UL (ref 0.1–0.9)
MONOCYTES NFR BLD AUTO: 8 %
NEUTROPHILS # BLD AUTO: 3.8 X10E3/UL (ref 1.4–7)
NEUTROPHILS NFR BLD AUTO: 60 %
PLATELET # BLD AUTO: 386 X10E3/UL (ref 150–450)
POTASSIUM SERPL-SCNC: 4.6 MMOL/L (ref 3.5–5.2)
PROT SERPL-MCNC: 7.1 G/DL (ref 6–8.5)
RBC # BLD AUTO: 4.9 X10E6/UL (ref 3.77–5.28)
SODIUM SERPL-SCNC: 136 MMOL/L (ref 134–144)
T4 FREE SERPL-MCNC: 1.14 NG/DL (ref 0.82–1.77)
TSH SERPL DL<=0.005 MIU/L-ACNC: 1.42 UIU/ML (ref 0.45–4.5)
WBC # BLD AUTO: 6.3 X10E3/UL (ref 3.4–10.8)

## 2021-02-26 ENCOUNTER — VIRTUAL VISIT (OUTPATIENT)
Dept: INTERNAL MEDICINE CLINIC | Age: 41
End: 2021-02-26
Payer: COMMERCIAL

## 2021-02-26 DIAGNOSIS — F41.9 ANXIETY: Primary | ICD-10-CM

## 2021-02-26 DIAGNOSIS — K59.09 OTHER CONSTIPATION: ICD-10-CM

## 2021-02-26 DIAGNOSIS — R56.9 SEIZURE (HCC): ICD-10-CM

## 2021-02-26 DIAGNOSIS — E04.9 THYROID GOITER: ICD-10-CM

## 2021-02-26 DIAGNOSIS — Z98.890 S/P COLONOSCOPY: ICD-10-CM

## 2021-02-26 PROCEDURE — 99214 OFFICE O/P EST MOD 30 MIN: CPT | Performed by: INTERNAL MEDICINE

## 2021-02-26 RX ORDER — CLONAZEPAM 0.5 MG/1
0.5 TABLET ORAL 2 TIMES DAILY
Qty: 60 TAB | Refills: 5 | Status: SHIPPED | OUTPATIENT
Start: 2021-02-26 | End: 2022-03-02 | Stop reason: SDUPTHER

## 2021-02-26 NOTE — PROGRESS NOTES
Consent: Sadaf Tsang, who was seen by synchronous (real-time) audio-video technology, and/or her healthcare decision maker, is aware that this patient-initiated, Telehealth encounter on 2/26/2021 is a billable service, with coverage as determined by her insurance carrier. She is aware that she may receive a bill and has provided verbal consent to proceed: YES  712  Subjective:   Sadaf Tsang is a 38 y.o. female who was seen for Medication Evaluation      1.thyroid goiter-no sxs and tsh normal in 8/20  2.constipation-better did have normal colonoscopy  3.anxiety -[facility] and will be part of a larger group with less management work  4.ho seizure-none recently able to drive    Current Outpatient Medications   Medication Sig    clonazePAM (KlonoPIN) 0.5 mg tablet Take 1 Tab by mouth two (2) times a day. Max Daily Amount: 1 mg.  clonazePAM (KlonoPIN) 0.5 mg tablet Take 1 Tab by mouth two (2) times a day.  cholecalciferol, vitamin D3, (VITAMIN D3) 2,000 unit tab Take  by mouth.  montelukast (SINGULAIR) 10 mg tablet Take 1 Tab by mouth daily.  multivitamin (ONE A DAY) tablet Take 1 Tab by mouth daily.  Omega-3-DHA-EPA-Fish Oil (FISH OIL) 1,000 mg (120 mg-180 mg) cap Take  by mouth.  triamcinolone (NASACORT) 55 mcg nasal inhaler 2 Sprays by Both Nostrils route daily. (Patient taking differently: 2 Sprays by Both Nostrils route as needed.)     No current facility-administered medications for this visit. No Known Allergies    Past Medical History:   Diagnosis Date    Pap smear for cervical cancer screening 4/30/13    Negative, HPV negative    Seizure ([facility] [address] 10/15/2012       ROS  All other systems reviewed and negative, unless mentioned in HPI    Objective:   Vital Signs: (As obtained by patient/caregiver at home)  There were no vitals taken for this visit.      [INSTRUCTIONS:  \"[x]\" Indicates a positive item  \"[]\" Indicates a negative item  -- DELETE ALL ITEMS NOT EXAMINED]    Constitutional: [x] Appears well-developed and well-nourished [x] No apparent distress      [] Abnormal -     Mental status: [x] Alert and awake  [x] Oriented to person/place/time [x] Able to follow commands    [] Abnormal -     Eyes:   EOM    [x]  Normal    [] Abnormal -   Sclera  [x]  Normal    [] Abnormal -          Discharge [x]  None visible   [] Abnormal -     HENT: [x] Normocephalic, atraumatic  [] Abnormal -       External Ears [x] Normal  [] Abnormal -    Neck: [x] No visualized mass [] Abnormal -     Pulmonary/Chest: [x] Respiratory effort normal   [x] No visualized signs of difficulty breathing or respiratory distress        [] Abnormal -      Musculoskeletal:            [x] Normal range of motion of neck        [] Abnormal -     Neurological:        [x] No Facial Asymmetry (Cranial nerve 7 motor function) (limited exam due to video visit)          [x] No gaze palsy        [] Abnormal -          Skin:        [x] No significant exanthematous lesions or discoloration noted on facial skin         [] Abnormal -            Psychiatric:       [x] Normal Affect [] Abnormal -           Other pertinent observable physical exam findings:-        Assessment & Plan:   Diagnoses and all orders for this visit:    1. Anxiety  -     clonazePAM (KlonoPIN) 0.5 mg tablet; Take 1 Tab by mouth two (2) times a day. Max Daily Amount: 1 mg.    2. Thyroid goiter  Stable and normal tsh in summer  3. Other constipation  Better with inc fiber  4. Seizure (HCC)-none recently can drive    5. S/P colonoscopy    appt in summer for fasting cpe        We discussed the expected course, resolution and complications of the diagnosis(es) in detail. Medication risks, benefits, costs, interactions, and alternatives were discussed as indicated. I advised her to contact the office if her condition worsens, changes or fails to improve as anticipated. She expressed understanding with the diagnosis(es) and plan.      Farrel End Mariella Mullen is a 38 y.o. female being evaluated by a video visit encounter for concerns as above. A caregiver was present when appropriate. Due to this being a TeleHealth encounter (During [facility]-07 public health emergency), evaluation of the following organ systems was limited: Vitals/Constitutional/EENT/Resp/CV/GI//MS/Neuro/Skin/Heme-Lymph-Imm. Pursuant to the emergency declaration under the [address] [place] [address], [facility] waiver authority and the [employer] Act, this Virtual  Visit was conducted, with patient's (and/or legal guardian's) consent, to reduce the patient's risk of exposure to COVID-19 and provide necessary medical care. Services were provided through a video synchronous discussion virtually to substitute for in-person clinic visit. Patient and provider were located at their individual homes.

## 2021-09-01 ENCOUNTER — OFFICE VISIT (OUTPATIENT)
Dept: INTERNAL MEDICINE CLINIC | Age: 41
End: 2021-09-01
Payer: COMMERCIAL

## 2021-09-01 VITALS
BODY MASS INDEX: 31.32 KG/M2 | DIASTOLIC BLOOD PRESSURE: 60 MMHG | HEIGHT: 65 IN | RESPIRATION RATE: 16 BRPM | HEART RATE: 68 BPM | WEIGHT: 188 LBS | TEMPERATURE: 98.1 F | OXYGEN SATURATION: 98 % | SYSTOLIC BLOOD PRESSURE: 110 MMHG

## 2021-09-01 DIAGNOSIS — F41.9 ANXIETY: Primary | ICD-10-CM

## 2021-09-01 DIAGNOSIS — R56.9 SEIZURE (HCC): ICD-10-CM

## 2021-09-01 DIAGNOSIS — E04.9 THYROID GOITER: ICD-10-CM

## 2021-09-01 LAB
ALBUMIN SERPL-MCNC: 3.8 G/DL (ref 3.5–5)
ALBUMIN/GLOB SERPL: 1.1 {RATIO} (ref 1.1–2.2)
ALP SERPL-CCNC: 44 U/L (ref 45–117)
ALT SERPL-CCNC: 18 U/L (ref 12–78)
ANION GAP SERPL CALC-SCNC: 4 MMOL/L (ref 5–15)
AST SERPL-CCNC: 4 U/L (ref 15–37)
BILIRUB SERPL-MCNC: 0.5 MG/DL (ref 0.2–1)
BUN SERPL-MCNC: 12 MG/DL (ref 6–20)
BUN/CREAT SERPL: 16 (ref 12–20)
CALCIUM SERPL-MCNC: 8.8 MG/DL (ref 8.5–10.1)
CHLORIDE SERPL-SCNC: 106 MMOL/L (ref 97–108)
CHOLEST SERPL-MCNC: 204 MG/DL
CO2 SERPL-SCNC: 25 MMOL/L (ref 21–32)
CREAT SERPL-MCNC: 0.74 MG/DL (ref 0.55–1.02)
GLOBULIN SER CALC-MCNC: 3.4 G/DL (ref 2–4)
GLUCOSE SERPL-MCNC: 93 MG/DL (ref 65–100)
HCV AB SERPL QL IA: NONREACTIVE
HDLC SERPL-MCNC: 57 MG/DL
HDLC SERPL: 3.6 {RATIO} (ref 0–5)
LDLC SERPL CALC-MCNC: 131.6 MG/DL (ref 0–100)
POTASSIUM SERPL-SCNC: 4.4 MMOL/L (ref 3.5–5.1)
PROT SERPL-MCNC: 7.2 G/DL (ref 6.4–8.2)
SODIUM SERPL-SCNC: 135 MMOL/L (ref 136–145)
T4 FREE SERPL-MCNC: 0.9 NG/DL (ref 0.8–1.5)
TRIGL SERPL-MCNC: 77 MG/DL (ref ?–150)
TSH SERPL DL<=0.05 MIU/L-ACNC: 1.07 UIU/ML (ref 0.36–3.74)
VLDLC SERPL CALC-MCNC: 15.4 MG/DL

## 2021-09-01 PROCEDURE — 99214 OFFICE O/P EST MOD 30 MIN: CPT | Performed by: INTERNAL MEDICINE

## 2021-09-01 RX ORDER — CLONAZEPAM 0.5 MG/1
0.5 TABLET ORAL 2 TIMES DAILY
Qty: 60 TABLET | Refills: 5 | Status: SHIPPED | OUTPATIENT
Start: 2021-09-01 | End: 2022-03-02 | Stop reason: SDUPTHER

## 2021-09-01 NOTE — PROGRESS NOTES
Nahomi Patel  Identified pt with two pt identifiers(name and ). Chief Complaint   Patient presents with    Follow-up     pt is presenting today for medication evaluation and refill       1. Have you been to the ER, urgent care clinic since your last visit? Hospitalized since your last visit? NO    2. Have you seen or consulted any other health care providers outside of the [address] [place] [address] since your last visit? Include any pap smears or colon screening. NO      Provider notified of reason for visit, vitals and flowsheets obtained on patients.      Patient received paperwork for advance directive during previous visit but has not completed at this time     Reviewed record In preparation for visit, huddled with provider and have obtained necessary documentation      Health Maintenance Due   Topic    Hepatitis C Screening     PAP AKA CERVICAL CYTOLOGY     Flu Vaccine (1)       Wt Readings from Last 3 Encounters:   21 188 lb (85.3 kg)   19 186 lb (84.4 kg)   19 184 lb (83.5 kg)     Temp Readings from Last 3 Encounters:   21 98.1 °F (36.7 °C) (Oral)   19 98.5 °F (36.9 °C) (Oral)   19 98.6 °F (37 °C) (Oral)     BP Readings from Last 3 Encounters:   21 110/60   19 106/70   19 111/63     Pulse Readings from Last 3 Encounters:   21 68   19 67   19 68     Vitals:    21 0827   BP: 110/60   Pulse: 68   Resp: 16   Temp: 98.1 °F (36.7 °C)   TempSrc: Oral   SpO2: 98%   Weight: 188 lb (85.3 kg)   Height: 5' 5\" (1.651 m)   PainSc:   0 - No pain   LMP: 08/10/2021         Learning Assessment:  :     Learning Assessment 3/25/2015   PRIMARY LEARNER Patient   PRIMARY LANGUAGE ENGLISH   LEARNER PREFERENCE PRIMARY DEMONSTRATION   ANSWERED BY patient   RELATIONSHIP SELF       Depression Screening:  :     3 most recent PHQ Screens 2021   Little interest or pleasure in doing things Not at all   Feeling down, depressed, irritable, or hopeless Not at all   Total Score PHQ 2 0       Fall Risk Assessment:  :     No flowsheet data found. Abuse Screening:  :     No flowsheet data found. ADL Screening:  :     No flowsheet data found. Medication reconciliation up to date and corrected with patient at this time.

## 2021-09-01 NOTE — PROGRESS NOTES
HISTORY OF PRESENT ILLNESS  Gregoria Velazquez is a 42 y.o. female. HPI  Six month follow up, feeling well. Issues:    1. Thyroid goiter. Ultrasound September of 2020 showed stable goiter. She is not really having any obstructive symptoms. 2. Anxiety, seizure. Very stable, no evidence of breakthrough episodes, no significant anxiety on Klonopin 0.5 b.i.d. 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 3. Preventive care. Saw Dr. Lyla Fragoso, had mammogram and pap, all normal.  Will get flu shot in the fall. Review of Systems   Constitutional: Negative for chills, fever, malaise/fatigue and weight loss. Respiratory: Negative for cough, shortness of breath and wheezing. Cardiovascular: Negative for chest pain, palpitations, orthopnea, leg swelling and PND. Gastrointestinal: Negative for abdominal pain, heartburn and nausea. Musculoskeletal: Negative for myalgias. Neurological: Negative for dizziness, seizures and headaches. Psychiatric/Behavioral: The patient is not nervous/anxious. Physical Exam  Vitals and nursing note reviewed. Constitutional:       Appearance: She is well-developed. HENT:      Head: Normocephalic and atraumatic. Neck:      Thyroid: No thyromegaly. Vascular: No carotid bruit. Comments: thyromegaly  Cardiovascular:      Rate and Rhythm: Normal rate and regular rhythm. Heart sounds: Normal heart sounds, S1 normal and S2 normal. No murmur heard. Pulmonary:      Effort: Pulmonary effort is normal. No respiratory distress. Breath sounds: Normal breath sounds. No wheezing or rales. Musculoskeletal:      Cervical back: Normal range of motion and neck supple. Neurological:      Mental Status: She is alert and oriented to person, place, and time. Psychiatric:         Behavior: Behavior normal.         ASSESSMENT and PLAN  Diagnoses and all orders for this visit:    1. Anxiety  -     clonazePAM (KlonoPIN) 0.5 mg tablet;  Take 1 Tablet by mouth two (2) times a day. 2. Seizure ([facility] [address]  -     METABOLIC PANEL, COMPREHENSIVE; Future    3. Thyroid goiter  -     HEPATITIS C AB; Future  -     LIPID PANEL; Future  -     TSH 3RD GENERATION; Future  -     T4, FREE;  Future

## 2022-01-25 ENCOUNTER — TELEPHONE (OUTPATIENT)
Dept: INTERNAL MEDICINE CLINIC | Age: 42
End: 2022-01-25

## 2022-01-25 NOTE — TELEPHONE ENCOUNTER
Per patient , her recent refill on Clonazepam was only for 14-15 pills, asking for the second half of her script to be filled / script to the pharmacy.  She karla reached at [phone],

## 2022-01-25 NOTE — TELEPHONE ENCOUNTER
Per the pharmacy she only received 30 pills of her [place] refill so she can stop by & pickup the other half of her script today. Patient made aware of this & voiced understanding.

## 2022-03-02 ENCOUNTER — OFFICE VISIT (OUTPATIENT)
Dept: INTERNAL MEDICINE CLINIC | Age: 42
End: 2022-03-02
Payer: COMMERCIAL

## 2022-03-02 VITALS
BODY MASS INDEX: 31.09 KG/M2 | HEART RATE: 71 BPM | OXYGEN SATURATION: 97 % | HEIGHT: 65 IN | DIASTOLIC BLOOD PRESSURE: 68 MMHG | WEIGHT: 186.6 LBS | RESPIRATION RATE: 16 BRPM | SYSTOLIC BLOOD PRESSURE: 114 MMHG

## 2022-03-02 DIAGNOSIS — F41.9 ANXIETY: ICD-10-CM

## 2022-03-02 PROCEDURE — 99213 OFFICE O/P EST LOW 20 MIN: CPT | Performed by: INTERNAL MEDICINE

## 2022-03-02 RX ORDER — CLONAZEPAM 0.5 MG/1
0.5 TABLET ORAL 2 TIMES DAILY
Qty: 60 TABLET | Refills: 5 | Status: SHIPPED | OUTPATIENT
Start: 2022-03-02 | End: 2022-09-07 | Stop reason: SDUPTHER

## 2022-03-02 NOTE — PROGRESS NOTES
HISTORY OF PRESENT ILLNESS  Kishor Clemente is a 36 y.o. female. HPI  She is doing well, big changes. She and her  are planning to leave their current workplace in the fall and looking at moving to [facility]. She wants more flexibility to see her family in [facility]). Feels well. Klonopin 0.5 b.i.d. has worked well. No side effects. Review of Systems   Constitutional: Negative for chills, fever and weight loss. Respiratory: Negative for cough, shortness of breath and wheezing. Cardiovascular: Negative for chest pain, palpitations, orthopnea, leg swelling and PND. Gastrointestinal: Negative for heartburn and nausea. Musculoskeletal: Negative for myalgias. Neurological: Negative for dizziness and headaches. Psychiatric/Behavioral: Negative for depression. The patient is not nervous/anxious. Physical Exam  Vitals and nursing note reviewed. Constitutional:       Appearance: She is well-developed. HENT:      Head: Normocephalic and atraumatic. Neck:      Thyroid: No thyromegaly. Vascular: No carotid bruit. Cardiovascular:      Rate and Rhythm: Normal rate and regular rhythm. Heart sounds: Normal heart sounds, S1 normal and S2 normal. No murmur heard. Pulmonary:      Effort: Pulmonary effort is normal. No respiratory distress. Breath sounds: Normal breath sounds. No wheezing or rales. Musculoskeletal:      Cervical back: Normal range of motion and neck supple. Neurological:      Mental Status: She is alert and oriented to person, place, and time. Psychiatric:         Behavior: Behavior normal.         ASSESSMENT and PLAN  Diagnoses and all orders for this visit:    1. Anxiety  -     clonazePAM (KlonoPIN) 0.5 mg tablet; Take 1 Tablet by mouth two (2) times a day. Plan check  Lipids again in 6 mo  The patient is asked to make an attempt to improve diet and exercise patterns to aid in medical management of this problem.

## 2022-03-19 PROBLEM — Z98.890 S/P COLONOSCOPY: Status: ACTIVE | Noted: 2021-02-26

## 2022-09-07 ENCOUNTER — OFFICE VISIT (OUTPATIENT)
Dept: INTERNAL MEDICINE CLINIC | Age: 42
End: 2022-09-07
Payer: COMMERCIAL

## 2022-09-07 VITALS
TEMPERATURE: 98 F | DIASTOLIC BLOOD PRESSURE: 80 MMHG | HEART RATE: 75 BPM | WEIGHT: 190.8 LBS | SYSTOLIC BLOOD PRESSURE: 114 MMHG | OXYGEN SATURATION: 98 % | BODY MASS INDEX: 31.79 KG/M2 | HEIGHT: 65 IN | RESPIRATION RATE: 16 BRPM

## 2022-09-07 DIAGNOSIS — R56.9 SEIZURE (HCC): ICD-10-CM

## 2022-09-07 DIAGNOSIS — E04.9 THYROID GOITER: ICD-10-CM

## 2022-09-07 DIAGNOSIS — Z51.81 MEDICATION MONITORING ENCOUNTER: ICD-10-CM

## 2022-09-07 DIAGNOSIS — F41.9 ANXIETY: Primary | ICD-10-CM

## 2022-09-07 DIAGNOSIS — Z00.00 GENERAL MEDICAL EXAM: ICD-10-CM

## 2022-09-07 LAB
AMPHETAMINE QL URINE POC: NEGATIVE
BARBITURATES UR POC: NEGATIVE
BENZODIAZEPINES UR POC: NEGATIVE
COCAINE QL URINE POC: NEGATIVE
LOT EXP DATE POC: NORMAL
LOT NUMBER POC: NORMAL
MARIJUANA (THC) QL URINE POC: NEGATIVE
MDMA/ECSTASY UR POC: NEGATIVE
METHADONE QL URINE POC: 0
METHAMPHETAMINE QL URINE POC: NEGATIVE
NTI OTHER MICRO TRANSPORT 977598: 0
OPIATES QL URINE POC: NEGATIVE
OXYCODONE UR POC: NEGATIVE
VALID INTERNAL CONTROL?: YES

## 2022-09-07 PROCEDURE — 99214 OFFICE O/P EST MOD 30 MIN: CPT | Performed by: INTERNAL MEDICINE

## 2022-09-07 PROCEDURE — 80305 DRUG TEST PRSMV DIR OPT OBS: CPT | Performed by: INTERNAL MEDICINE

## 2022-09-07 RX ORDER — CLONAZEPAM 0.5 MG/1
0.5 TABLET ORAL 2 TIMES DAILY
Qty: 180 TABLET | Refills: 1 | Status: SHIPPED | OUTPATIENT
Start: 2022-09-07

## 2022-09-07 NOTE — PROGRESS NOTES
HISTORY OF PRESENT ILLNESS  Susan Perez is a 36 y.o. female. PEPITO Smith will be going to [place] to help her sister, who is having a . She has stopped working and she and her  are looking at moving to the [facility]. She feels well. She has had no evidence of seizures, no anxiety, remains very stable on Klonopin 0.5 b.i.d. and uses no other substances. She does have a thyroid goiter. Two years ago we did ultrasound, will repeat. Some struggles losing weight, no other symptoms of hypo or hyperthyroidism. Review of Systems   Constitutional:  Negative for chills, fever and weight loss. Respiratory:  Negative for cough, shortness of breath and wheezing. Cardiovascular:  Negative for chest pain, palpitations, orthopnea, leg swelling and PND. Gastrointestinal:  Negative for heartburn and nausea. Musculoskeletal:  Negative for myalgias. Neurological:  Negative for dizziness, seizures and headaches. Psychiatric/Behavioral:  The patient is not nervous/anxious. Physical Exam  Vitals and nursing note reviewed. Constitutional:       Appearance: She is well-developed. HENT:      Head: Normocephalic and atraumatic. Neck:      Thyroid: No thyromegaly. Vascular: No carotid bruit. Comments: Thyroid goiter no nodules palpable  Cardiovascular:      Rate and Rhythm: Normal rate and regular rhythm. Heart sounds: Normal heart sounds, S1 normal and S2 normal. No murmur heard. Pulmonary:      Effort: Pulmonary effort is normal. No respiratory distress. Breath sounds: Normal breath sounds. No wheezing or rales. Musculoskeletal:      Cervical back: Normal range of motion and neck supple. Neurological:      Mental Status: She is alert and oriented to person, place, and time. Psychiatric:         Behavior: Behavior normal.       ASSESSMENT and PLAN  Diagnoses and all orders for this visit:    1. Anxiety  -     clonazePAM (KlonoPIN) 0.5 mg tablet;  Take 1 Tablet by mouth two (2) times a day. 2. Medication monitoring encounter  -     AMB POC ALERE ICUP DX DRUG SCREEN 10    3. Thyroid goiter  -     US THYROID/PARATHYROID/SOFT TISS; Future    4. General medical exam  -     CBC WITH AUTOMATED DIFF; Future  -     METABOLIC PANEL, COMPREHENSIVE; Future  -     TSH 3RD GENERATION; Future  -     T4, FREE; Future  -     LIPID PANEL; Future  -     HEMOGLOBIN A1C WITH EAG; Future    5.  Seizure (HCC)-stable on med

## 2022-09-08 LAB
ALBUMIN SERPL-MCNC: 3.6 G/DL (ref 3.5–5)
ALBUMIN/GLOB SERPL: 1.2 {RATIO} (ref 1.1–2.2)
ALP SERPL-CCNC: 41 U/L (ref 45–117)
ALT SERPL-CCNC: 19 U/L (ref 12–78)
ANION GAP SERPL CALC-SCNC: 5 MMOL/L (ref 5–15)
AST SERPL-CCNC: 7 U/L (ref 15–37)
BASOPHILS # BLD: 0.1 K/UL (ref 0–0.1)
BASOPHILS NFR BLD: 1 % (ref 0–1)
BILIRUB SERPL-MCNC: 0.3 MG/DL (ref 0.2–1)
BUN SERPL-MCNC: 14 MG/DL (ref 6–20)
BUN/CREAT SERPL: 18 (ref 12–20)
CALCIUM SERPL-MCNC: 8.8 MG/DL (ref 8.5–10.1)
CHLORIDE SERPL-SCNC: 107 MMOL/L (ref 97–108)
CHOLEST SERPL-MCNC: 160 MG/DL
CO2 SERPL-SCNC: 28 MMOL/L (ref 21–32)
CREAT SERPL-MCNC: 0.77 MG/DL (ref 0.55–1.02)
DIFFERENTIAL METHOD BLD: NORMAL
EOSINOPHIL # BLD: 0.3 K/UL (ref 0–0.4)
EOSINOPHIL NFR BLD: 3 % (ref 0–7)
ERYTHROCYTE [DISTWIDTH] IN BLOOD BY AUTOMATED COUNT: 12.4 % (ref 11.5–14.5)
EST. AVERAGE GLUCOSE BLD GHB EST-MCNC: 120 MG/DL
GLOBULIN SER CALC-MCNC: 3 G/DL (ref 2–4)
GLUCOSE SERPL-MCNC: 94 MG/DL (ref 65–100)
HBA1C MFR BLD: 5.8 % (ref 4–5.6)
HCT VFR BLD AUTO: 38.1 % (ref 35–47)
HDLC SERPL-MCNC: 47 MG/DL
HDLC SERPL: 3.4 {RATIO} (ref 0–5)
HGB BLD-MCNC: 12.5 G/DL (ref 11.5–16)
IMM GRANULOCYTES # BLD AUTO: 0 K/UL (ref 0–0.04)
IMM GRANULOCYTES NFR BLD AUTO: 0 % (ref 0–0.5)
LDLC SERPL CALC-MCNC: 87.2 MG/DL (ref 0–100)
LYMPHOCYTES # BLD: 1.9 K/UL (ref 0.8–3.5)
LYMPHOCYTES NFR BLD: 22 % (ref 12–49)
MCH RBC QN AUTO: 28.7 PG (ref 26–34)
MCHC RBC AUTO-ENTMCNC: 32.8 G/DL (ref 30–36.5)
MCV RBC AUTO: 87.6 FL (ref 80–99)
MONOCYTES # BLD: 0.8 K/UL (ref 0–1)
MONOCYTES NFR BLD: 9 % (ref 5–13)
NEUTS SEG # BLD: 5.7 K/UL (ref 1.8–8)
NEUTS SEG NFR BLD: 65 % (ref 32–75)
NRBC # BLD: 0 K/UL (ref 0–0.01)
NRBC BLD-RTO: 0 PER 100 WBC
PLATELET # BLD AUTO: 358 K/UL (ref 150–400)
PMV BLD AUTO: 10 FL (ref 8.9–12.9)
POTASSIUM SERPL-SCNC: 4.1 MMOL/L (ref 3.5–5.1)
PROT SERPL-MCNC: 6.6 G/DL (ref 6.4–8.2)
RBC # BLD AUTO: 4.35 M/UL (ref 3.8–5.2)
SODIUM SERPL-SCNC: 140 MMOL/L (ref 136–145)
T4 FREE SERPL-MCNC: 0.9 NG/DL (ref 0.8–1.5)
TRIGL SERPL-MCNC: 129 MG/DL (ref ?–150)
TSH SERPL DL<=0.05 MIU/L-ACNC: 1.34 UIU/ML (ref 0.36–3.74)
VLDLC SERPL CALC-MCNC: 25.8 MG/DL
WBC # BLD AUTO: 8.8 K/UL (ref 3.6–11)

## 2022-09-19 ENCOUNTER — HOSPITAL ENCOUNTER (OUTPATIENT)
Dept: ULTRASOUND IMAGING | Age: 42
Discharge: HOME | End: 2022-09-19
Attending: INTERNAL MEDICINE
Payer: COMMERCIAL

## 2022-09-19 DIAGNOSIS — E04.9 THYROID GOITER: ICD-10-CM

## 2022-09-19 PROCEDURE — 76536 US EXAM OF HEAD AND NECK: CPT

## 2023-02-09 ENCOUNTER — OFFICE VISIT (OUTPATIENT)
Dept: INTERNAL MEDICINE CLINIC | Age: 43
End: 2023-02-09
Payer: COMMERCIAL

## 2023-02-09 VITALS
RESPIRATION RATE: 16 BRPM | HEART RATE: 66 BPM | SYSTOLIC BLOOD PRESSURE: 112 MMHG | OXYGEN SATURATION: 99 % | BODY MASS INDEX: 31.29 KG/M2 | HEIGHT: 65 IN | DIASTOLIC BLOOD PRESSURE: 60 MMHG | TEMPERATURE: 98.1 F | WEIGHT: 187.8 LBS

## 2023-02-09 DIAGNOSIS — E04.9 THYROID GOITER: ICD-10-CM

## 2023-02-09 DIAGNOSIS — R73.01 IFG (IMPAIRED FASTING GLUCOSE): ICD-10-CM

## 2023-02-09 DIAGNOSIS — R56.9 SEIZURE (HCC): ICD-10-CM

## 2023-02-09 DIAGNOSIS — F41.9 ANXIETY: Primary | ICD-10-CM

## 2023-02-09 PROCEDURE — 99214 OFFICE O/P EST MOD 30 MIN: CPT | Performed by: INTERNAL MEDICINE

## 2023-02-09 RX ORDER — CLONAZEPAM 0.5 MG/1
0.5 TABLET ORAL 2 TIMES DAILY
Qty: 180 TABLET | Refills: 1 | Status: SHIPPED | OUTPATIENT
Start: 2023-02-09

## 2023-02-09 NOTE — PROGRESS NOTES
HISTORY OF PRESENT ILLNESS  To Mcmahan is a 36 y.o. female. HPI  Seen for six month med check. She and her  will be moving to the [place], [place] area the end of this month and I wish her well. Issues:  1. Thyroid goiter, has been stable. Ultrasound in September showed stable goiter, small nodules. Thyroid function has been normal.  2. History of seizure and anxiety, well maintained on Klonopin 0.5 b.i.d. No seizure-like episodes in the years I have known her and mood and function very stable, will refill med. 3. Mild impaired fasting glucose. A1c in the 5.8 range. Weight is stable. I wish her well with the move to [place]. Review of Systems   Constitutional:  Negative for chills, fever, malaise/fatigue and weight loss. Respiratory:  Negative for cough, shortness of breath and wheezing. Cardiovascular:  Negative for chest pain, palpitations, orthopnea, leg swelling and PND. Gastrointestinal:  Negative for abdominal pain, heartburn and nausea. Musculoskeletal:  Negative for myalgias. Neurological:  Negative for dizziness, focal weakness, seizures and headaches. Psychiatric/Behavioral:  The patient is not nervous/anxious and does not have insomnia. Physical Exam  Vitals and nursing note reviewed. Constitutional:       Appearance: She is well-developed. HENT:      Head: Normocephalic and atraumatic. Neck:      Thyroid: No thyromegaly. Vascular: No carotid bruit. Comments: Thyroid goiter non tender  Cardiovascular:      Rate and Rhythm: Normal rate and regular rhythm. Heart sounds: Normal heart sounds, S1 normal and S2 normal. No murmur heard. Pulmonary:      Effort: Pulmonary effort is normal. No respiratory distress. Breath sounds: Normal breath sounds. No wheezing or rales. Musculoskeletal:      Cervical back: Normal range of motion and neck supple. Right lower leg: No edema. Left lower leg: No edema.    Neurological:      Mental Status: She is alert and oriented to person, place, and time. Psychiatric:         Behavior: Behavior normal.       ASSESSMENT and PLAN  Diagnoses and all orders for this visit:    1. Anxiety  -     clonazePAM (KlonoPIN) 0.5 mg tablet; Take 1 Tablet by mouth two (2) times a day. 2. Thyroid goiter-stable us in 9/22 sp left hemithyroidectomy and small  nodule present  No compressive sxs  -     TSH 3RD GENERATION; Future  -     T4, FREE; Future    3. IFG (impaired fasting glucose)  -     METABOLIC PANEL, COMPREHENSIVE; Future  -     LIPID PANEL; Future  -     HEMOGLOBIN A1C WITH EAG; Future    4. Seizure ([facility] [address] [address]  -     clonazePAM (KlonoPIN) 0.5 mg tablet; Take 1 Tablet by mouth two (2) times a day.

## 2023-02-24 DIAGNOSIS — R56.9 SEIZURE (HCC): ICD-10-CM

## 2023-02-24 DIAGNOSIS — F41.9 ANXIETY: ICD-10-CM

## 2023-02-24 RX ORDER — CLONAZEPAM 0.5 MG/1
0.5 TABLET ORAL 2 TIMES DAILY
Qty: 180 TABLET | Refills: 1 | Status: SHIPPED | OUTPATIENT
Start: 2023-02-24